# Patient Record
Sex: FEMALE | Race: OTHER | HISPANIC OR LATINO | URBAN - METROPOLITAN AREA
[De-identification: names, ages, dates, MRNs, and addresses within clinical notes are randomized per-mention and may not be internally consistent; named-entity substitution may affect disease eponyms.]

---

## 2022-11-14 ENCOUNTER — APPOINTMENT (RX ONLY)
Dept: URBAN - METROPOLITAN AREA CLINIC 95 | Facility: CLINIC | Age: 42
Setting detail: DERMATOLOGY
End: 2022-11-14

## 2022-11-14 DIAGNOSIS — L27.1 LOCALIZED SKIN ERUPTION DUE TO DRUGS AND MEDICAMENTS TAKEN INTERNALLY: ICD-10-CM | Status: INADEQUATELY CONTROLLED

## 2022-11-14 PROCEDURE — ? PRESCRIPTION MEDICATION MANAGEMENT

## 2022-11-14 PROCEDURE — 99214 OFFICE O/P EST MOD 30 MIN: CPT

## 2022-11-14 PROCEDURE — ? COUNSELING

## 2022-11-14 NOTE — HPI: RASH
How Severe Is Your Rash?: moderate
Is This A New Presentation, Or A Follow-Up?: Follow Up Rash
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 She denies any current use if nsaid containing meds. Continues with Tylenol, Xanax, sertraline, glipizide (new since last visit), trulicity (new since last visit), fluconazole prn yeast infections. She was recently seen in ED at start of symptoms for IV steroids which she finds more effective than by mouth. She is feeling better symptom wise but has more areas of involvement, including lips and face along with exacerbation of prior areas. She has topical clobetasol at home as well which has been helpful for her more itchy lesions. 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

## 2022-11-14 NOTE — PROCEDURE: PRESCRIPTION MEDICATION MANAGEMENT
Plan: Pt was given IV pred in ED,  does not feel that she will need any additional steroids at this time as symptoms are starting to improve. This is the most extensive flare of lesions she has had. Each episode is worse than the prior with re activation of prior lesions as well. She has been to the ED on other occasions outside of this visit since last being seen as well, stating she caught the flares earlier. \\n\\ncolleagues consulted on this case, particularly with Ddx including erythema multiforme for which she has been treated with chronic anti viral treatment in the past with flares nonetheless. 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 Finding culprit medication(s) will be of utmost importance. SPoke with patient and told her to discontinue all medications, excluding xanax and sertraline as those will need to be tapered. Her new diabetic drugs will be continued for now. dicussed drug patch testing with [facility] which she will also pursue.
Render In Strict Bullet Format?: No
Detail Level: Simple
Continue Regimen: Clobetasol ointment twice daily up to 2 weeks on to symptomatic lesions.

## 2022-11-14 NOTE — PROCEDURE: COUNSELING
Detail Level: Detailed
Patient Specific Counseling (Will Not Stick From Patient To Patient): Pt to discontinue as many medications as possible, including Tylenol and fluconazole. Absolutely no NSAIDS. Discussed follow up with allergy for patch testing with medications on prior areas of involvement given possibility of FDE to more than 1 med? She is somewhat unclear with her medication history snd what meds she is taking.